# Patient Record
Sex: MALE | Race: WHITE | NOT HISPANIC OR LATINO | URBAN - METROPOLITAN AREA
[De-identification: names, ages, dates, MRNs, and addresses within clinical notes are randomized per-mention and may not be internally consistent; named-entity substitution may affect disease eponyms.]

---

## 2020-04-13 ENCOUNTER — TELEPHONE CONVERTED (OUTPATIENT)
Dept: OTHER | Facility: HOSPITAL | Age: 40
End: 2020-04-13
Attending: NURSE PRACTITIONER

## 2020-05-21 ENCOUNTER — OFFICE VISIT CONVERTED (OUTPATIENT)
Dept: OTHER | Facility: HOSPITAL | Age: 40
End: 2020-05-21
Attending: NURSE PRACTITIONER

## 2020-05-21 ENCOUNTER — CONVERSION ENCOUNTER (OUTPATIENT)
Dept: OTHER | Facility: HOSPITAL | Age: 40
End: 2020-05-21

## 2020-06-17 ENCOUNTER — OFFICE VISIT CONVERTED (OUTPATIENT)
Dept: OTHER | Facility: HOSPITAL | Age: 40
End: 2020-06-17
Attending: NURSE PRACTITIONER

## 2020-06-17 ENCOUNTER — CONVERSION ENCOUNTER (OUTPATIENT)
Dept: OTHER | Facility: HOSPITAL | Age: 40
End: 2020-06-17

## 2021-03-30 ENCOUNTER — OFFICE VISIT CONVERTED (OUTPATIENT)
Dept: NEUROSURGERY | Facility: CLINIC | Age: 41
End: 2021-03-30
Attending: NEUROLOGICAL SURGERY

## 2021-05-10 NOTE — H&P
History and Physical      Patient Name: Tramaine Chu   Patient ID: [id number]   Sex: Male   YOB: 1980    Primary Care Provider: Jamila LANTIGUA   Referring Provider: Jamila LANTIGUA    Visit Date: March 30, 2021    Provider: Jamie Valdivia MD   Location: [facility]   Location Address: [address] [place] [address]  [id number]   Location Phone: [phone]          Chief Complaint  · Back pain      History Of Present Illness  The patient is a 41 year old /White male, who presents on referral from Jamila LANTIGUA, for a neurosurgical evaluation of low back pain and right leg pain and weakness (worse after stroke).   The pain developed acutely March of 2020 after a stroke, but had chronic lower back pain for several years. It is 7/10 in severity has a sharp quality and radiates into the right lower leg in a nonspecific distribution. The pain has been constant. The patient has not identified any aggravating factors. No alleviating factors are reported.   He also reports intermittent numbness into the foot. Lumbar discectomy in his 20s.   RECENT INTERVENTIONS:  He has been previously treated with referral to pain medications.   INFORMATION REVIEWED:  The following information was reviewed: radiology reports and images. MRI of the lumbar spine revealed L4-5 and L5-S1 spinal stenosis with a left sided disc protrusion.       Past Medical History  Alcohol abuse; Anticoagulated with warfarin; CAD (coronary artery disease); Chronic low back pain; CVA (cerebral infarction); CVA (cerebral vascular accident); DDD (degenerative disc disease), lumbar; Hypertension; Iron deficiency anemia; Lupus anticoagulant disorder; Myocardial Infarction         Past Surgical History  Back Surgery, Lumbar; Stent placment         Medication List  aspirin 81 mg oral tablet,delayed release (DR/EC); atorvastatin 40 mg oral tablet; buspirone 5 mg oral tablet; ferrous  sulfate 324 mg (65 mg iron) oral tablet,delayed release (DR/EC); fluoxetine 40 mg oral capsule; gabapentin 800 mg oral tablet; lisinopril 10 mg oral tablet; oxycodone-acetaminophen 5-325 mg oral tablet; prazosin 2 mg oral capsule; trazodone 50 mg oral tablet; Vitamin C 250 mg oral tablet; warfarin 4 mg oral tablet         Allergy List  NO KNOWN DRUG ALLERGIES       Allergies Reconciled  Family Medical History  Heart Disease; Hypertension         Social History  Alcohol (Former); Other Substance Use; Tobacco (Former)         Immunizations  Name Date Admin   Influenza Refused         Review of Systems  · Constitutional  o Denies  o : chills, excessive sweating, fatigue, fever, sycope/passing out, weight gain, weight loss  · Eyes  o Denies  o : changes in vision, blurry vision, double vision  · HENT  o Denies  o : loss of hearing, ringing in the ears, ear aches, sore throat, nasal congestion, sinus pain, nose bleeds, seasonal allergies  · Cardiovascular  o Admits  o : easy burising or bleeding  o Denies  o : blood clots, swollen legs, anemia, transfusions  · Respiratory  o Denies  o : shortness of breath, dry cough, productive cough, pneumonia, COPD  · Gastrointestinal  o Denies  o : difficulty swallowing, reflux  · Genitourinary  o Denies  o : incontinence  · Neurologic  o Admits  o : stroke, dizziness/vertigo, difficulty with sleep, weakness  o Denies  o : headache, seizure, tremor, loss of balance, falls, numbness/tingling/paresthesia , difficulty with coordination, difficulty with dexterity  · Musculoskeletal  o Admits  o : muscle aches, weakness, spasms, sciatica, pain radiating in arm, pain radiating in leg, low back pain  o Denies  o : neck stiffness/pain, swollen lymph nodes, joint pain  · Endocrine  o Denies  o : diabetes, thyroid disorder  · Psychiatric  o Admits  o : anxiety, depression  · All Others Negative      Vitals  Date Time BP Position Site L\R Cuff Size HR RR TEMP (F) WT  HT  BMI kg/m2 BSA m2 O2 Sat  FR L/min FiO2 HC       03/30/2021 01:50 PM        96.8 184lbs 8oz 6'   25.02 2.06             Physical Examination  · Constitutional  o Appearance  o : well-nourished, well developed, alert, in no acute distress  · Respiratory  o Respiratory Effort  o : breathing unlabored  · Cardiovascular  o Peripheral Vascular System  o :   § Extremities  § : no edema or cyanosis  · Musculoskeletal  o Spine  o :   § Inspection/Palpation  § : incision to right of midline in lower lumbar region  o Right Lower Extremity  o :   § Inspection/Palpation  § : no joint or limb tenderness to palpation, no edema present, no ecchymosis  § Joint Stability  § : joint stability within normal limits  § Range of Motion  § : range of motion normal, no joint crepitations present, no pain on motion, Laex's test negative  o Left Lower Extremity  o :   § Inspection/Palpation  § : no joint or limb tenderness to palpation, no edema present, no ecchymosis  § Joint Stability  § : joint stability within normal limits  § Range of Motion  § : range of motion normal, no joint crepitations present, no pain on motion, Alex's test negative  · Skin and Subcutaneous Tissue  o Extremities  o :   § Right Lower Extremity  § : no lesions or areas of discoloration  § Left Lower Extremity  § : no lesions or areas of discoloration  o Back  o : no lesions or areas of discoloration  · Neurologic  o Mental Status Examination  o :   § Orientation  § : speech slow from previous stroke.   o Motor Examination  o :   § RLE Strength  § : mild weakness from previous stroke  § RLE Motor Function  § : increased tone  § LLE Strength  § : strength normal  § LLE Motor Function  § : tone normal, no atrophy, no abnormal movements noted  o Reflexes  o :   § RUE  § : Covarrubias's reflex  § RLE  § : knee and ankle reflexes 2+/4, SLR negative, clonus  § LLE  § : knee and ankle reflexes 2/4, SLR negative  o Sensation  o :   § Light Touch  § : sensation intact to light touch in  extremities  · Psychiatric  o Mood and Affect  o : mood normal, affect appropriate              Assessment  · Degeneration of lumbosacral intervertebral disc     722.52/M51.37  L4-5 and L5-S1  · Lumbosacral disc herniation, L5-S1     722.10/M51.27  with right leg pain      Plan  · Medications  o Medications have been Reconciled  o Transition of Care or Provider Policy  · Instructions  o Encouraged to follow-up with Primary Care Provider for preventative care.  o The ROS and the PFSH were reviewed at today's visit.  o I have discussed the risks and benefits of surgery versus physical therapy, epidural steroids, and other conservative forms of treatment.  o We have discussed the benefits of core strengthening. Patient will work on improving the core muscle strength with low impact activities such as walking, swimming, Pilates, etc.   o He will f/u with any left leg pain/numbness.   o He will work on his core strength for the lower back pain and degenerative disc disease.             Electronically Signed by: Jamie Valdivia MD -Author on March 30, 2021 02:35:28 PM

## 2021-05-12 NOTE — PROGRESS NOTES
Quick Note      Patient Name: Tramaine Chu   Patient ID: [id number]   Sex: Male   YOB: 1980        Visit Date: April 13, 2020    Provider: RHINA Dangelo   Location: [facility]   Location Address: [address] [place] [address]  [id number]   Location Phone: [phone]          History Of Present Illness  Video Conferencing Visit  Tramaine Chu is a 40 year old /White male who is presenting for evaluation via video conferencing. Verbal consent obtained before beginning visit.   The following staff were present during this visit: RHINA Swartz and Kassandra Nuñez CMA   TELEHEALTH VISIT  Chief Complaint: NEW PATIENT   Tramaine Chu is a 40 year old /White male who is presenting for evaluation via telehealth visit. Verbal consent obtained before beginning visit.   Provider spent 12 minutes with the patient during telehealth visit.   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  He was noted to be in rehab for 2 weeks and was discharged home patient also has a past medical history of coronary artery disease including an MI with stent placement.  He was discharged on atorvastatin for hyperlipidemia, famotidine for acid reflux.  Was also sent home on gabapentin related to some neuropathy on the right side. Patient is also noted to be on iron replacement was noted to be anemic while in the hospital.    He was also anticoagulated with warfarin and aspirin.  Last INR was 1.3 we have since increased his Coumadin to 6 mg Monday Wednesday Friday and 5 mg the other days.    Patient states that he was on routine tramadol for chronic low back pain, states that he has had lumbar surgery in the past.  Girlfriend is with patient today and states that he is only taking the tramadol as needed the most  she is given is 3 times a day.     Complains of knot in lower abdomen pain, noticed after sent home from rehab above his waistband. Given anticoagulation via subcu route while in the hospital.    Time In: 0935  Time Out: 0947           Assessment  · CAD (coronary artery disease)     414.00/I25.10  · DDD (degenerative disc disease), lumbar     722.52/M51.36  · Chronic low back pain       Low back pain     724.2/M54.5  Other chronic pain     724.2/G89.29  · CVA (cerebral vascular accident)     434.91/I63.9  · Iron deficiency anemia     280.9/D50.9    Problems Reconciled  Plan  · Orders  o Physical, Primary Care Panel (CBC, CMP, Lipid, TSH) [facility] ([id number], [id number], 04416, [id number]) - 414.00/I25.10, 434.91/I63.9 - 04/13/2020  o Physician Telephone Evaluation, 11-20 minutes ([id number]) - - 04/13/2020  o STACI Report (KASPR) - 722.52/M51.36, 724.2/M54.5 - 04/13/2020  o Drug Screen Confirmation Send Out (CONDS) - 722.52/M51.36, 724.2/M54.5, 724.2/G89.29 - 04/13/2020  o Iron Profile (Iron 76247 TIBC 60932 and Transferrin 24533) (IRONP) - 280.9/D50.9 - 04/13/2020  · Medications  o warfarin 1 mg oral tablet   SIG: take 1 tablet (1 mg) by oral route once daily with 5mg on MWF for a total of 6mg on MWF   DISP: (30) tablets with 5 refills  Prescribed on 04/13/2020     o aspirin 81 mg oral tablet,delayed release (DR/EC)   SIG: take 1 tablet (81 mg) by oral route once daily for 30 days   DISP: (30) tablet with 2 refills  Prescribed on 04/13/2020     o atorvastatin 40 mg oral tablet   SIG: take 1 tablet (40 mg) by oral route once daily at bedtime for 30 days   DISP: (30) tablet with 2 refills  Prescribed on 04/13/2020     o famotidine 20 mg oral tablet   SIG: take 1 tablet (20 mg) by oral route 2 times per day for 30 days   DISP: (60) tablet with 2 refills  Prescribed on 04/13/2020     o ferrous sulfate 324 mg (65 mg iron) oral tablet,delayed release (DR/EC)   SIG: take 1 tablet by oral route 2 times a day for 30 days   DISP: (60) tablet with  2 refills  Prescribed on 04/13/2020     o gabapentin 100 mg oral capsule   SIG: take 1 capsule by oral route 2 times a day for 30 days and 2/HS   DISP: (120) capsule with 0 refills  Prescribed on 04/13/2020     o tramadol 50 mg oral tablet   SIG: take 1 tablet (50 mg) by oral route every 4 hours as needed for Low Back Pain   DISP: (75) tablet with 0 refills  Prescribed on 04/13/2020     o Tylenol 325 mg oral tablet   SIG: take 1 tablet by oral route every 6 hours as needed for 30 days   DISP: (90) tablet with 2 refills  Prescribed on 04/13/2020     o Vitamin C 250 mg oral tablet   SIG: take 2 tablets by oral route daily for 30 days   DISP: (60) tablet with 2 refills  Prescribed on 04/13/2020     o warfarin 5 mg oral tablet   SIG: take 1 tablet (5 mg) by oral route once daily for 30 days   DISP: (30) tablets with 5 refills  Prescribed on 04/13/2020     o Medications have been Reconciled  o Transition of Care or Provider Policy  · Instructions  o Plan Of Care: At this time we will continue current medications as prescribed. INR was 1.3 so we will increase his warfarin to 6 mg Monday Wednesday Friday and 5 mg on the other days. We will repeat that level in 2 weeks. Patient and girlfriend both voiced understanding and all questions answered.  o Chronic conditions reviewed and taken into consideration for today's treatment plan.  o Take all medications as prescribed/directed.  o Call the office with any concerns or questions.  o Discussed Covid-19 precautions including, but not limited to, social distancing, avoid touching your face, and hand washing.   o Did discuss with patient that tramadol and gabapentin are both controlled substances and that he needed to use the tramadol on a as needed basis only. We will send a lab order for routine lab work and a urine drug screen via home health.  o Controlled substance agreement has been signed. Urine drug screen and Cristi reports have been reviewed and there are no  discrepancies. Patient has denied side effect of medications. Patient advised to keep medications in original prescription bottle. Patient is to keep medications in a locked area away from children. Advised not to drive or operate heavy machinery when taking medications.  · Disposition  o Follow Up in 6 weeks            Electronically Signed by: RHINA Dangelo -Author on April 13, 2020 11:59:05 AM

## 2021-05-13 NOTE — PROGRESS NOTES
Progress Note      Patient Name: Tramaine Chu   Patient ID: [id number]   Sex: Male   YOB: 1980    Primary Care Provider: Jamila LANTIGUA   Referring Provider: Jamila LANTIGUA    Visit Date: June 17, 2020    Provider: RHINA Dangelo   Location: [facility]   Location Address: [address] [place] [address]  [id number]   Location Phone: [phone]          Chief Complaint  · Fluctuating blood pressure   · Abnormal lung imaging      History Of Present Illness  Tramaine Chu is a 40 year old /White male who presents for evaluation and treatment of:      Here today for fluctuating blood pressure. On June 3rd home health nurse called our office because patient was experiencing dizziness and blood pressure was running low so Lisinopril was d/c at that time. States blood pressure will run high some times 157/94 and 159/101, will then take Lisinopril. Mild dizziness and headache with elevated blood pressure. Checks blood pressure bid, 1/am and 1/hs.    Neurologist wants pcp to discuss abnormal lung imaging, called [facility] for all medical records to be faxed to our office.     Had episode of weakness/dizziness, speech slurred, tripped over own feet, severe confusion for 4 days. Called neurologist in [place] and spoke with NP(Mad[place]), was advised that this is normal post stroke and patients will have good/bad days.    Pain management discontinued tramadol and started hydrocodone. Also, increased gabapentin from 100mg to 300mg TID. Patient was also started on duloxetine at last visit for arthralgias and myalgias and also some depression and anxiety.    Patient is also anticoagulated with Coumadin status post ischemic stroke last INR was 3, he is currently on 6 mg Monday Wednesday Friday and 5 mg the other days.  He is due to have it repeated at the end of the month.       Past Medical History  Disease Name Date Onset Notes   Alcohol abuse --  --    Anticoagulated  with warfarin 05/21/2020 --    CAD (coronary artery disease) --  --    Chronic low back pain 04/13/2020 --    CVA (cerebral infarction) --  --    CVA (cerebral vascular accident) 04/13/2020 --    DDD (degenerative disc disease), lumbar 04/13/2020 --    Hypertension --  --    Iron deficiency anemia 04/13/2020 --    Lupus anticoagulant disorder --  no formal treatment at this time   Myocardial Infarction --  --          Past Surgical History  Procedure Name Date Notes   Back Surgery, Lumbar --  --    Stent placment --  --          Medication List  Name Date Started Instructions   aspirin 81 mg oral tablet,delayed release (DR/EC) 04/13/2020 take 1 tablet (81 mg) by oral route once daily for 30 days   atorvastatin 40 mg oral tablet 04/13/2020 take 1 tablet (40 mg) by oral route once daily at bedtime for 30 days   duloxetine 30 mg oral capsule,delayed release(DR/EC) 05/21/2020 take 1 capsule (30 mg) by oral route once daily for 30 days   ferrous sulfate 324 mg (65 mg iron) oral tablet,delayed release (DR/EC) 04/13/2020 take 1 tablet by oral route 2 times a day for 30 days   gabapentin 300 mg oral capsule  take 1 capsule (300 mg) by oral route 3 times per day   hydrocodone-acetaminophen 5-325 mg oral tablet  take 1 tablet by oral route 2 times a day   Tylenol 325 mg oral tablet 04/13/2020 take 1 tablet by oral route every 6 hours as needed for 30 days   Vitamin C 250 mg oral tablet 04/13/2020 take 2 tablets by oral route daily for 30 days   warfarin 1 mg oral tablet 04/13/2020 take 1 tablet (1 mg) by oral route once daily with 5mg on MWF for a total of 6mg on MWF   warfarin 5 mg oral tablet 04/13/2020 take 1 tablet (5 mg) by oral route once daily for 30 days         Allergy List  Allergen Name Date Reaction Notes   NO KNOWN DRUG ALLERGIES --  --  --        Allergies Reconciled  Family Medical History  Disease Name Relative/Age Notes   Heart Disease  --    Hypertension  --          Social History  Finding Status Start/Stop  Quantity Notes   Alcohol Former --/-- --  --    Other Substance Use --  --/-- --  Kratom use   Tobacco Former 15/42 --  --          Immunizations  NameDate Admin Mfg Trade Name Lot Number Route Inj VIS Given VIS Publication   InfluenzaRefused 04/13/2020 NE Not Entered  NE NE     Comments:          Review of Systems  · Constitutional  o Admits  o : dizziness  o Denies  o : sick contacts, fever, chills, fatigue, weakness  · Cardiovascular  o Denies  o : dypnea on exertion, pain in chest  · Respiratory  o Denies  o : shortness of breath, cough  · Gastrointestinal  o Denies  o : diarrhea, constipation  · Genitourinary  o Denies  o : urgency, frequency  · Neurologic  o Admits  o : headache  · Musculoskeletal  o Admits  o : muscle pain, back pain      Vitals  Date Time BP Position Site L\R Cuff Size HR RR TEMP (F) WT  HT  BMI kg/m2 BSA m2 O2 Sat HC       06/17/2020 12:11 /70 Sitting    78 - R 16 97.8 148lbs 8oz 6'   20.14 1.85 99 %          Physical Examination  · Constitutional  o Appearance  o : well-nourished, well developed, alert, in no acute distress, well-tended appearance  · Head and Face  o Head  o :   § Inspection  § : atraumatic, normocephalic  · Eyes  o Eyes  o : extraocular movements intact, no scleral icterus, no conjunctival injection  · Respiratory  o Respiratory Effort  o : breathing comfortably, symmetric chest rise  o Auscultation of Lungs  o : clear to asculatation bilaterally, no wheezes, rales, or rhonchii  · Cardiovascular  o Heart  o :   § Auscultation of Heart  § : regular rate and rhythm, no murmurs, rubs, or gallops  o Peripheral Vascular System  o :   § Extremities  § : no edema  · Gastrointestinal  o Abdominal Examination  o :   § Abdomen  § : bowel sounds present, non-distended, non-tender  · Skin and Subcutaneous Tissue  o General Inspection  o : no lesions present, no areas of discoloration, skin turgor normal  · Neurologic  o Mental Status Examination  o :   § Orientation  § :  grossly oriented to person, place and time  o Gait and Station  o :   § Gait Screening  § : normal gait  · Psychiatric  o General  o : normal mood and affect          Assessment  · Chronic low back pain       Low back pain     724.2/M54.5  Other chronic pain     724.2/G89.29  · CVA (cerebral vascular accident)     434.91/I63.9  · DDD (degenerative disc disease), lumbar     722.52/M51.36  · Hypertension     401.9/I10  · Abnormal finding on lung imaging     793.19/R91.8    Problems Reconciled  Plan  · Orders  o ACO-39: Current medications updated and reviewed () - - 06/17/2020  · Medications  o lisinopril 10 mg oral tablet   SIG: take 1 tablet (10 mg) by oral route once daily for 30 days   DISP: (30) tablets with 1 refills  Prescribed on 06/17/2020     · Instructions  o Take all medications as prescribed/directed.  o Patient was educated/instructed on their diagnosis, treatment and medications prior to discharge from the clinic today.  o Call the office with any concerns or questions.  o At this time we will resume his lisinopril at 10 mg once nightly, advised him to take this routinely and monitor his blood pressure if it becomes too elevated or too low please call the office and we will make appropriate adjustments.  o I have sent request to get his medical records from [facility] to review the abnormal imaging, treatment plan will pend those results. Advised patient and his wife to call the office if they have not heard anything from us within the next week.  · Disposition  o Follow Up PRN     At this time I advised Tramaine to keep his routine appointment in August, home health will be following him and monitoring blood pressure at home and will call if there is any abnormals.    EMR dragon/transcription disclaimer: Much of this encounter note is an electronic transcription/translation of spoken language to printed text.  Electronic translation of spoken language may permit erroneous, or at times nonsensical words  or phrases to be inadvertently transcribed; although I have reviewed the note for such errors, some may still exist.             Electronically Signed by: RHINA Dangelo -Author on June 17, 2020 12:02:01 PM

## 2021-05-13 NOTE — PROGRESS NOTES
Progress Note      Patient Name: Tramaine Chu   Patient ID: [id number]   Sex: Male   YOB: 1980    Primary Care Provider: Jamila LANTIGUA   Referring Provider: Jamila LANTIGUA    Visit Date: May 21, 2020    Provider: RHINA Dangelo   Location: [facility]   Location Address: [address] [place] [address]  [id number]   Location Phone: [phone]          Chief Complaint  · Follow up   · Med refills      History Of Present Illness  Tramaine Chu is a 40 year old /White male who presents for evaluation and treatment of:      Routine follow-up.  Patient is currently on warfarin for history of CVA last INR was 2.7.  He does have home health that is drawing labs and working with him for PT.  He is on lisinopril for hypertension under good control.  Using atorvastatin for hyperlipidemia tolerates well denies side effects.    Patient does have complaints of chronic low back pain he has a history of having lumbar surgery, currently he is managed with tramadol and gabapentin.  Pain is not very well controlled.  Recently completed a lumbar x-ray which did show degenerative changes of the spine.  We have ordered an MRI of the L-spine, waiting for it to be scheduled.  Pain today is an 8 out of 10.  Cristi and urine drug screen are both up-to-date.    Complaints of left shoulder pain, has noticed a knot, over the AC joint has gotten larger and tender.  He is more tender with abduction.  No imaging has been done.       Past Medical History  Disease Name Date Onset Notes   Alcohol abuse --  --    Anticoagulated with warfarin 05/21/2020 --    CAD (coronary artery disease) --  --    Chronic low back pain 04/13/2020 --    CVA (cerebral infarction) --  --    CVA (cerebral vascular accident) 04/13/2020 --    DDD (degenerative disc disease), lumbar 04/13/2020 --    Hypertension --  --    Iron deficiency anemia 04/13/2020 --    Lupus anticoagulant disorder --  no formal treatment at this  time   Myocardial Infarction --  --          Past Surgical History  Procedure Name Date Notes   Back Surgery, Lumbar --  --    Stent placment --  --          Medication List  Name Date Started Instructions   aspirin 81 mg oral tablet,delayed release (DR/EC) 04/13/2020 take 1 tablet (81 mg) by oral route once daily for 30 days   atorvastatin 40 mg oral tablet 04/13/2020 take 1 tablet (40 mg) by oral route once daily at bedtime for 30 days   famotidine 20 mg oral tablet 04/13/2020 take 1 tablet (20 mg) by oral route 2 times per day for 30 days   ferrous sulfate 324 mg (65 mg iron) oral tablet,delayed release (DR/EC) 04/13/2020 take 1 tablet by oral route 2 times a day for 30 days   gabapentin 100 mg oral capsule 04/13/2020 take 1 capsule by oral route 2 times a day for 30 days and 2/HS   lisinopril 20 mg oral tablet 05/05/2020 take 1 tablet (20 mg) by oral route once daily for 30 days   tramadol 50 mg oral tablet 05/20/2020 take 1 tablet (50 mg) by oral route every 4 hours as needed for Low Back Pain   Tylenol 325 mg oral tablet 04/13/2020 take 1 tablet by oral route every 6 hours as needed for 30 days   Vitamin C 250 mg oral tablet 04/13/2020 take 2 tablets by oral route daily for 30 days   warfarin 1 mg oral tablet 04/13/2020 take 1 tablet (1 mg) by oral route once daily with 5mg on MWF for a total of 6mg on MWF   warfarin 5 mg oral tablet 04/13/2020 take 1 tablet (5 mg) by oral route once daily for 30 days         Allergy List  Allergen Name Date Reaction Notes   NO KNOWN DRUG ALLERGIES --  --  --        Allergies Reconciled  Family Medical History  Disease Name Relative/Age Notes   Heart Disease  --    Hypertension  --          Social History  Finding Status Start/Stop Quantity Notes   Alcohol Former --/-- --  --    Other Substance Use --  --/-- --  Kratom use   Tobacco Former 15/42 --  --          Immunizations  NameDate Admin Mfg Trade Name Lot Number Route Inj VIS Given VIS Publication   InfluenzaRefused  04/13/2020 NE Not Entered  NE NE     Comments:          Review of Systems  · Constitutional  o Denies  o : fatigue, weakness  · Cardiovascular  o Denies  o : pain in chest  · Respiratory  o Denies  o : shortness of breath, cough  · Gastrointestinal  o Denies  o : diarrhea, constipation  · Genitourinary  o Denies  o : urgency, frequency  · Musculoskeletal  o Admits  o : muscle pain, back pain, shoulder pain  o Denies  o : joint pain      Vitals  Date Time BP Position Site L\R Cuff Size HR RR TEMP (F) WT  HT  BMI kg/m2 BSA m2 O2 Sat        05/21/2020 10:38 /75 Sitting    76 - R 16 97.4 145lbs 4oz    100 %          Physical Examination  · Constitutional  o Appearance  o : well-nourished, well developed, alert, in no acute distress, well-tended appearance  · Head and Face  o Head  o :   § Inspection  § : atraumatic, normocephalic  · Eyes  o Eyes  o : extraocular movements intact, no scleral icterus, no conjunctival injection  · Respiratory  o Respiratory Effort  o : breathing comfortably, symmetric chest rise  o Auscultation of Lungs  o : clear to asculatation bilaterally, no wheezes, rales, or rhonchii  · Cardiovascular  o Heart  o :   § Auscultation of Heart  § : regular rate and rhythm, no murmurs, rubs, or gallops  o Peripheral Vascular System  o :   § Extremities  § : no edema  · Gastrointestinal  o Abdominal Examination  o :   § Abdomen  § : bowel sounds present, non-distended, non-tender  · Skin and Subcutaneous Tissue  o General Inspection  o : no lesions present, no areas of discoloration, skin turgor normal  · Neurologic  o Mental Status Examination  o :   § Orientation  § : grossly oriented to person, place and time  o Gait and Station  o :   § Gait Screening  § : normal gait  · Psychiatric  o General  o : normal mood and affect  · Left Shoulder  o Inspection  o : no redness, no scarring, no swelling, no atrophy  o Palpation  o : tenderness to palpation, AC joint tenderness present       Figure 1.0:  Pain Rating Scale-Isaías         Assessment  · Anxiety disorder     300.00/F41.9  · Depression     311/F32.9  · Screening for depression     V79.0/Z13.89  · Chronic low back pain       Low back pain     724.2/M54.5  Other chronic pain     724.2/G89.29  · CVA (cerebral vascular accident)     434.91/I63.9  · DDD (degenerative disc disease), lumbar     722.52/M51.36  · Hypertension     401.9/I10  · Left shoulder pain     719.41/M25.512  · Anticoagulated with warfarin     V58.61/Z79.01    Problems Reconciled  Plan  · Orders  o Annual depression screening, 15 minutes (35428, ) - V79.0/Z13.89 - 05/21/2020  o ACO-18: Positive screen for clinical depression using a standardized tool and a follow-up plan documented () - V79.0/Z13.89 - 05/21/2020  o ACO-39: Current medications updated and reviewed () - - 05/21/2020  o ACO-14: Influenza immunization was not administered for reasons documented () - - 05/21/2020  o Shoulder (Left) [facility]erred [facility] ([id number]) - 719.41/M25.512 - 05/21/2020  o PAIN MANAGEMENT CONSULTATION (PAINM) - 724.2/M54.5, 722.52/M51.36 - 05/21/2020   CPA, has had an Lspine Xray and waiting on the MRI results   · Medications  o duloxetine 30 mg oral capsule,delayed release(DR/EC)   SIG: take 1 capsule (30 mg) by oral route once daily for 30 days   DISP: (30) capsules with 2 refills  Prescribed on 05/21/2020     o gabapentin 100 mg oral capsule   SIG: take 1 capsule by oral route 2 times a day for 30 days and 2/HS   DISP: (120) capsule with 2 refills  Refilled on 05/21/2020     o Medications have been Reconciled  o Transition of Care or Provider Policy  · Instructions  o Depression Screen completed and scanned into the EMR under the designated folder within the patient's documents.  o Today's PHQ-9 result is ___13___  o Patient is taking medications as prescribed and doing well.   o Patient was educated/instructed on their diagnosis, treatment and medications prior to discharge from the  clinic today.  o Call the office with any concerns or questions.  o Chronic conditions reviewed and taken into consideration for today's treatment plan.  o We will start patient on duloxetine 30 mg once daily, I feel this will help with some depression and anxiety but also may have been added musculoskeletal relief. Patient and wife both voiced understanding and all questions answered. Did discuss the potential side effects and benefits of treatment.  o At this time we will get an x-ray of his left shoulder, pending those results we will consider orthopedics.  o We are currently in the process of getting an MRI scheduled for his low back will ahead and put the referral in for pain management.  · Disposition  o Follow Up in 3 months     Tramaine macias in 3 months, advised them that if he had any concerns to please follow-up sooner.    EMR dragon/transcription disclaimer: Much of this encounter note is an electronic transcription/translation of spoken language to printed text.  Electronic translation of spoken language may permit erroneous, or at times nonsensical words or phrases to be inadvertently transcribed; although I have reviewed the note for such errors, some may still exist.             Electronically Signed by: RHINA Dangelo -Author on May 21, 2020 10:19:51 AM

## 2021-05-14 VITALS — WEIGHT: 184.5 LBS | HEIGHT: 72 IN | BODY MASS INDEX: 24.99 KG/M2 | TEMPERATURE: 96.8 F

## 2021-05-15 VITALS
DIASTOLIC BLOOD PRESSURE: 70 MMHG | WEIGHT: 148.5 LBS | OXYGEN SATURATION: 99 % | BODY MASS INDEX: 20.11 KG/M2 | HEIGHT: 72 IN | HEART RATE: 78 BPM | TEMPERATURE: 97.8 F | SYSTOLIC BLOOD PRESSURE: 110 MMHG | RESPIRATION RATE: 16 BRPM

## 2021-05-15 VITALS
SYSTOLIC BLOOD PRESSURE: 121 MMHG | RESPIRATION RATE: 16 BRPM | TEMPERATURE: 97.4 F | OXYGEN SATURATION: 100 % | DIASTOLIC BLOOD PRESSURE: 75 MMHG | HEART RATE: 76 BPM | WEIGHT: 145.25 LBS